# Patient Record
Sex: MALE
[De-identification: names, ages, dates, MRNs, and addresses within clinical notes are randomized per-mention and may not be internally consistent; named-entity substitution may affect disease eponyms.]

---

## 2019-03-25 PROBLEM — Z00.00 ENCOUNTER FOR PREVENTIVE HEALTH EXAMINATION: Status: ACTIVE | Noted: 2019-03-25

## 2019-03-29 ENCOUNTER — APPOINTMENT (OUTPATIENT)
Dept: ORTHOPEDIC SURGERY | Facility: CLINIC | Age: 39
End: 2019-03-29
Payer: COMMERCIAL

## 2019-03-29 VITALS — BODY MASS INDEX: 24.52 KG/M2 | WEIGHT: 185 LBS | HEIGHT: 73 IN

## 2019-03-29 PROCEDURE — 73030 X-RAY EXAM OF SHOULDER: CPT | Mod: RT

## 2019-03-29 PROCEDURE — 76882 US LMTD JT/FCL EVL NVASC XTR: CPT | Mod: 59

## 2019-03-29 PROCEDURE — 20611 DRAIN/INJ JOINT/BURSA W/US: CPT | Mod: RT

## 2019-03-29 PROCEDURE — 99204 OFFICE O/P NEW MOD 45 MIN: CPT | Mod: 25

## 2019-03-29 NOTE — HISTORY OF PRESENT ILLNESS
[Pain Location] : pain [] : right shoulder [___ mths] : [unfilled] month(s) ago [2] : a current pain level of 2/10 [Intermit.] : ~He/She~ states the symptoms seem to be intermittent [Lifting] : worsened by lifting [Rest] : relieved by rest [de-identified] : RIGHT SHOULDER\par JANUARY 2019\par CARRYING HEAVY CABINETS\par PAIN LEVEL 2/10\par INTERMITTENT PAIN\par RADIATING PAIN\par BURNING, SHOOTING PAIN\par NOTHING HELPS\par REACHING AND LIFTING MAKE IT WORSE\par NIGHTTIME PAIN\par INSTABILITY\par 3 WEEKS OF PT WITH NO RELIEF\par

## 2019-03-29 NOTE — REASON FOR VISIT
[Initial Visit] : an initial visit for [Shoulder Pain] : shoulder pain [FreeTextEntry2] : RIGHT SHOULDER

## 2019-03-29 NOTE — PHYSICAL EXAM
[de-identified] : PHYSICAL EXAM  RIGHT  SHOULDER\par \par NORMAL POSTURE / SCAPULAR PROTRACTION\par AROM 120 / 120 /  65 / 15 \par TENDER: SA REGION\par \par SPECIAL TESTING :\par POSITIVE ARELLANO\par POSITIVE ALANNA\par POSITIVE SPEED TEST\par \par NEGATIVE BEAR\par NEGATIVE APPREHENSION AND SUPPRESSION\par \par RC STRENGTH TESTING \par SS:  5/5\par SUB 5/5\par IS     5/5\par BICEPS  5/5\par \par SENSATION  - GROSSLY INTACT\par \par \par  [de-identified] : RIGHT SHOULDER XRAY -  \par NO OBVIOUS FRACTURE , NO SIGNIFICANT OSTEOARTHRITIS, SEPARATION OR DISLOCATION \par TYPE 2B ACROMION \par CSA= 29\par

## 2019-03-29 NOTE — PROCEDURE
[de-identified] : DIAGNOSTIC SONOGRAPHY of the Rotator Cuff Soft Tissue of the RIGHT SHOULDER was performed in Multiple Scan Planes with varying transducer frequencies.\par Imaging of the Supraspinatus Tendon reveals INFLAMMATION AND TENDONITIS WITH OUT SIGNIFICANT TEAR\par Imaging of the Biceps Tendon reveals no significant tear.\par Imaging of the Subscapularis Tendon reveals no significant tear.\par Imaging of the Infraspinatus Tendon reveals no significant tear.\par Key images were save digitally and reviewed with patient.\par \par \par Patient has demonstrated limited relief from NSAIDS, rest, exercises / PT, and after discussion of the risks and benefits, the patient has elected to proceed with an ULTRASOUND GUIDED injection into the RIGHT SUBACROMIAL  SPACE LATERAL APPROACH \par  \par Confirmed that the patient does not have history of prior adverse reactions, active, infections, or relevant allergies. There was no effusion, erythema, or warmth, and the skin was clear\par \par The skin was sterilized with alcohol. Ethyl Chloride was used as a topical anesthetic. Routine sterile technique. \par The site was injected UTILIZING ULTRASOUND GUIDANCE to confirm appropriate placement of the needle-\par with a mixture of medication and local anesthetic. The injection was completed without complication and a bandage was applied.\par  \par The patient tolerated the procedure well and was given post-injection instructions.Rec: Cold therapy, analgesics, avoid heavy activity.\par MEDICATION: 4cc of 1% xylocaine + 10mg of KENALOG\par \par  [FreeTextEntry1] : POST INJECTION INSTRUCTIONS\par \par COLD THERAPY , ANALGESICS PRN\par \par HOME STRETCHING AND EXERCISES QD\par \par START P.T.  2 WEEKS AFTER INJECTION \par \par MRI IF NO RELIEF\par

## 2019-05-03 ENCOUNTER — RX RENEWAL (OUTPATIENT)
Age: 39
End: 2019-05-03

## 2019-05-22 ENCOUNTER — APPOINTMENT (OUTPATIENT)
Dept: ORTHOPEDIC SURGERY | Facility: CLINIC | Age: 39
End: 2019-05-22
Payer: COMMERCIAL

## 2019-05-22 PROCEDURE — 99214 OFFICE O/P EST MOD 30 MIN: CPT

## 2019-05-22 NOTE — REASON FOR VISIT
[Follow-Up Visit] : a follow-up visit for [Shoulder Pain] : shoulder pain [FreeTextEntry2] : RIGHT SHOULDER

## 2019-05-22 NOTE — HISTORY OF PRESENT ILLNESS
[Pain Location] : pain [] : right shoulder [___ mths] : [unfilled] month(s) ago [2] : a current pain level of 2/10 [Intermit.] : ~He/She~ states the symptoms seem to be intermittent [Lifting] : worsened by lifting [Rest] : relieved by rest [de-identified] : RIGHT SHOULDER\par JANUARY 2019\par CARRYING HEAVY CABINETS\par WORSENING AFTER THROWING A WIFFLE BALL\par PAIN LEVEL 2/10\par INTERMITTENT PAIN\par RADIATING PAIN\par BURNING, SHOOTING PAIN\par NOTHING HELPS\par REACHING AND LIFTING MAKE IT WORSE\par NIGHTTIME PAIN\par INSTABILITY\par 3 WEEKS OF PT WITH NO RELIEF\par PREVIOUS CORTISONE INJECTION MARCH 2019 HELPED FOR A FEW WEEKS\par

## 2019-05-22 NOTE — PHYSICAL EXAM
[de-identified] : PHYSICAL EXAM  RIGHT  SHOULDER\par \par NORMAL POSTURE / SCAPULAR PROTRACTION\par AROM 120 / 120 /  65 / 15 \par TENDER: SA REGION\par \par SPECIAL TESTING :\par POSITIVE ARELLANO\par POSITIVE ALANNA\par POSITIVE SPEED TEST\par \par NEGATIVE BEAR\par NEGATIVE APPREHENSION AND SUPPRESSION\par \par RC STRENGTH TESTING \par SS:  5/5\par SUB 5/5\par IS     5/5\par BICEPS  5/5\par \par SENSATION  - GROSSLY INTACT\par \par \par

## 2019-05-22 NOTE — DISCUSSION/SUMMARY
[de-identified] : Patient had only temporary relief from subacromial Kenalog injection\par Physical therapy has aggravated symptoms\par Patient now has periscapular pain and trigger points\par \par I reviewed the MRI consistent with  impingement, tendinitis a small subacromial spur.\par \par Consider arthroscopic acromioplasty, decompression, bursectomy

## 2019-08-05 ENCOUNTER — APPOINTMENT (OUTPATIENT)
Dept: ORTHOPEDIC SURGERY | Facility: CLINIC | Age: 39
End: 2019-08-05
Payer: COMMERCIAL

## 2019-08-05 VITALS — HEIGHT: 73 IN | WEIGHT: 185 LBS | BODY MASS INDEX: 24.52 KG/M2

## 2019-08-05 DIAGNOSIS — M75.81 OTHER SHOULDER LESIONS, RIGHT SHOULDER: ICD-10-CM

## 2019-08-05 PROCEDURE — 99213 OFFICE O/P EST LOW 20 MIN: CPT | Mod: 57

## 2019-08-05 RX ORDER — OXYCODONE AND ACETAMINOPHEN 7.5; 325 MG/1; MG/1
7.5-325 TABLET ORAL
Qty: 42 | Refills: 0 | Status: ACTIVE | COMMUNITY
Start: 2019-08-05 | End: 1900-01-01

## 2019-08-05 NOTE — HISTORY OF PRESENT ILLNESS
[Pain Location] : pain [] : right shoulder [de-identified] : PRE SURGICAL \par RIGHT SHOULDER\par PAIN LEVEL 2/10\par \par ICE AND MEDICATION AS NEEDED

## 2019-08-05 NOTE — PHYSICAL EXAM
[de-identified] : PREOPERATIVE EXAM\par \par PATIENT IS WELL-DEVELOPED AND APPEARS WELL-NOURISHED\par \par NECK: FULL RANGE OF MOTION NONTENDER\par CHEST: CLEAR TO AUSCULTATION WITHOUT RALES OR RHONCHI\par CARDIAC REGULAR RATE AND RHYTHM NO OBVIOUS MURMURS\par ABDOMEN NONDISTENDED NONTENDER NO PALPABLE ORGANOMEGALY\par EXTREMITIES GENERALLY INTACT RANGE OF MOTION GENERALLY INTACT NEUROLOGIC EXAM\par \par  RIGHT  SHOULDER\par NORMAL POSTURE\par AROM 130 / 130 /  65 / 15 \par TENDER: SA REGION\par \par SPECIAL TESTING :\par POSITIVE ARELLANO\par POSITIVE ALANNA\par POSITIVE SPEED TEST\par \par NEGATIVE BEAR\par NEGATIVE APPREHENSION AND SUPPRESSION\par \par RC STRENGTH TESTING \par SS:  5/5\par SUB 5/5\par IS     5/5\par BICEPS  5/5\par \par SENSATION  - GROSSLY INTACT\par \par \par

## 2019-08-05 NOTE — DISCUSSION/SUMMARY
[de-identified] : PREOP SHOULDER SURGERY DISCUSSION:\par \par \par THERE ARE NO GUARANTEES THAT ALL SYMPTOMS WILL BE ALLEVIATED  \par SHOULDER ARTHROSCOPY, ACROMIOPLASTY, DEBRIDEMENT,  RC REPAIR AND LABRUM REPAIRS- ON AVERAGE 75- 85% SATISFACTORY RESULTS FOR TEARS < 3CM AFTER 9-12 MONTHS HEALING AND REHABILITATION. \par \par REPAIRS WILL REQUIRE STRICT SHOULDER IMMOBILIZER 4-6 WEEKS\par \par RC TEARS 3CM OR LARGER MAY REQUIRE COLLAGEN PATCH AUGMENTATION GENERALLY HAVE LESS SATISFACTORY RESULTS\par \par PHYSICAL THERAPY REQUIRED 2X WEEK FOR  MINIMUM 8-12 WEEKS FOR ALL PROCEDURES \par CONTINUED HOME EXERCISES 6-9 MONTHS AFTER THAT REQUIRED FOR OPTIMAL OUTCOMES \par \par ROUTINE SURGICAL AND ANESTHETIC RISKS INCLUDE RISK OF SURGICAL INFECTION, ANESTHETIC COMPLICATION OR ALLERGY, POSSIBLE RETEARS OR PROGRESSION OF TEAR, STIFFNESS OF SHOULDER AND UNSATISFACTORY OUTCOMES\par \par PATIENT UNDERSTANDS AND WISHES TO PROCEED\par

## 2019-08-06 ENCOUNTER — APPOINTMENT (OUTPATIENT)
Dept: ORTHOPEDIC SURGERY | Facility: AMBULATORY SURGERY CENTER | Age: 39
End: 2019-08-06
Payer: COMMERCIAL

## 2019-08-06 PROCEDURE — 29823 SHO ARTHRS SRG XTNSV DBRDMT: CPT | Mod: RT,59

## 2019-08-06 PROCEDURE — 29826 SHO ARTHRS SRG DECOMPRESSION: CPT | Mod: RT

## 2019-08-06 PROCEDURE — 29820 SHO ARTHRS SRG PRTL SYNVCT: CPT | Mod: RT,59

## 2019-08-09 ENCOUNTER — APPOINTMENT (OUTPATIENT)
Dept: ORTHOPEDIC SURGERY | Facility: CLINIC | Age: 39
End: 2019-08-09
Payer: COMMERCIAL

## 2019-08-09 VITALS — BODY MASS INDEX: 24.52 KG/M2 | WEIGHT: 185 LBS | HEIGHT: 73 IN

## 2019-08-09 PROCEDURE — 73030 X-RAY EXAM OF SHOULDER: CPT | Mod: RT

## 2019-08-09 PROCEDURE — 99024 POSTOP FOLLOW-UP VISIT: CPT

## 2019-08-09 RX ORDER — IBUPROFEN 800 MG/1
800 TABLET ORAL 3 TIMES DAILY
Qty: 90 | Refills: 5 | Status: ACTIVE | COMMUNITY
Start: 2019-08-09 | End: 1900-01-01

## 2019-08-09 NOTE — HISTORY OF PRESENT ILLNESS
[3] : an average pain level of 3/10 [Lifting] : worsened by lifting [de-identified] : RIGHT SHOULDER\par POST OP\par AUGUST 6, 2019 - RIGHT RICCO , GRADE 1 RC FRAYING \par PAIN LEVEL 3/10\par STIFFNESS\par GENERAL WEAKNESS\par NO NUMBNESS OR TINGLING\par ICE AND MEDICATION AS NEEDED\par D/C SLING\par PT STARTED PENDULUM EXERCISES\par PT AWARE NEEDS TO TAKE OFF BANDAGES IN 1 WEEK

## 2019-08-09 NOTE — PHYSICAL EXAM
[de-identified] : PHYSICAL EXAM SHOULDER POSTOP \par \par BANDAGE REMOVED\par PORTALS CLEAN AND DRY -  NO ERYTHEMA OR CALOR\par SUTURES REMOVED STERISTRIPS AND WATERPROOF BANDAGES APPLIES\par AROM NOT TESTED - DISTAL MOTOR AND SENSORY EXAN GROSSLY INTACT \par

## 2019-09-20 ENCOUNTER — APPOINTMENT (OUTPATIENT)
Dept: ORTHOPEDIC SURGERY | Facility: CLINIC | Age: 39
End: 2019-09-20

## 2019-09-27 ENCOUNTER — APPOINTMENT (OUTPATIENT)
Dept: ORTHOPEDIC SURGERY | Facility: CLINIC | Age: 39
End: 2019-09-27
Payer: COMMERCIAL

## 2019-09-27 VITALS — HEIGHT: 73 IN | WEIGHT: 185 LBS | BODY MASS INDEX: 24.52 KG/M2

## 2019-09-27 DIAGNOSIS — M75.41 IMPINGEMENT SYNDROME OF RIGHT SHOULDER: ICD-10-CM

## 2019-09-27 DIAGNOSIS — M25.511 PAIN IN RIGHT SHOULDER: ICD-10-CM

## 2019-09-27 PROCEDURE — 20552 NJX 1/MLT TRIGGER POINT 1/2: CPT | Mod: RT,58

## 2019-09-27 PROCEDURE — 99024 POSTOP FOLLOW-UP VISIT: CPT

## 2019-09-27 NOTE — PROCEDURE
[de-identified] : TRIGGER POINT INJECTIONS\par \par Patient has demonstrated limited relief from NSAIDS, rest, exercises / PT, and after discussion of the risks and benefits, the patient elected to proceed with a trigger point injection into the \par \par RIGHT RHOMBOID x2\par \par  I confirmed no prior adverse reactions, no active infections, and no relevant allergies. \par The skin was prepped in the usual sterile manner. The site was injected with local anesthetic followed by local needling. \par The injection was completed without complication and a bandage was applied.\par  \par The patient tolerated the procedure well and was given post-injection instructions. Cold Tx x 48 hours, analgesics. prn \par Medications: 1.5cc of 1% xylocaine + 1.5cc .25% Bupivicaine + KENALOG 1MG  per site\par

## 2019-09-27 NOTE — HISTORY OF PRESENT ILLNESS
[3] : an average pain level of 3/10 [Lifting] : worsened by lifting [de-identified] : RIGHT SHOULDER\par POST OP  6 WEEKS \par AUGUST 6, 2019 - RIGHT RICCO , GRADE 1 RC FRAYING\par NO PAIN UNLESS LIFTING\par SLIGHT NUMBNESS

## 2019-09-27 NOTE — PHYSICAL EXAM
[de-identified] : POST OP SHOULDER EXAM \par \par PORTALS HEALING WELL - NO ERYTHEMA OR CALOR\par \par AROM  140 / 140 / 70 / 20 \par \par IMPINGEMENT SIGNS RESOLVING \par \par ROTATOR CUFF STRENGTH TESTING = 4++ / 5\par \par

## 2021-07-31 NOTE — REASON FOR VISIT
[facility] Hospitalist Progress Note    Subjective     Patient seen sitting up in chair. History obtained through . Reports no bowel movement for several days. Continues to have back pain. No chest pain shortness of breath or other acute complaints.    Objective     I/O's    Intake/Output Summary (Last 24 hours) at 7/31/2021 0912  Last data filed at 7/31/2021 0830  Gross per 24 hour   Intake --   Output 700 ml   Net -700 ml       Last Recorded Vitals  Vitals with min/max:      Vital Last Value 24 Hour Range   Temperature 98.4 °F (36.9 °C) (07/31/21 0740) Temp  Min: 97.9 °F (36.6 °C)  Max: 98.6 °F (37 °C)   Pulse 69 (07/31/21 0740) Pulse  Min: 67  Max: 87   Respiratory 16 (07/31/21 0740) Resp  Min: 16  Max: 19   Non-Invasive  Blood Pressure 115/64 (07/31/21 0740) BP  Min: 99/60  Max: 132/75   Pulse Oximetry 95 % (07/31/21 0740) SpO2  Min: 95 %  Max: 98 %   Arterial   Blood Pressure (!) 186/75 (07/30/21 0800) No data recorded        Body mass index is 28.9 kg/m².     Physical Exam    General: No acute distress . Pleasant and interactive.  Head: No signs of head trauma.   Eyes: Extraocular motions intact.   Ears: Hearing grossly normal.   Heart: Regular rate and rhythm. No m/r/g  Lungs: Normal respiratory rate and effort with clear lung fields bilaterally.No wheezing or crackles.  Skin No obvious rashes noted on body. Skin warm and dry  Musculoskeletal: Back brace in place  Neurologic: Awake and alert. No focal defecits.   Psychiatric: Mood normal.     Labs      Recent Results (from the past 24 hour(s))   GLUCOSE, BEDSIDE - POINT OF CARE    Collection Time: 07/30/21 11:00 AM   Result Value Ref Range    GLUCOSE, BEDSIDE - POINT OF CARE 147 (H) 70 - 99 mg/dL   GLUCOSE, BEDSIDE - POINT OF CARE    Collection Time: 07/30/21  6:01 PM   Result Value Ref Range    GLUCOSE, BEDSIDE - POINT OF CARE 97 70 - 99 mg/dL   GLUCOSE, BEDSIDE - POINT OF CARE    Collection Time: 07/30/21  9:05 PM   Result Value Ref Range     GLUCOSE, BEDSIDE - POINT OF CARE 112 (H) 70 - 99 mg/dL   Magnesium    Collection Time: 07/31/21  5:47 AM   Result Value Ref Range    Magnesium 2.1 1.7 - 2.4 mg/dL   Phosphorus    Collection Time: 07/31/21  5:47 AM   Result Value Ref Range    Phosphorus 2.5 2.4 - 4.7 mg/dL   Basic Metabolic Panel    Collection Time: 07/31/21  5:47 AM   Result Value Ref Range    Fasting Status      Sodium 134 (L) 135 - 145 mmol/L    Potassium 4.2 3.4 - 5.1 mmol/L    Chloride 104 98 - 107 mmol/L    Carbon Dioxide 29 21 - 32 mmol/L    Anion Gap 5 (L) 10 - 20 mmol/L    Glucose 88 65 - 99 mg/dL    BUN 27 (H) 6 - 20 mg/dL    Creatinine 0.92 0.67 - 1.17 mg/dL    Glomerular Filtration Rate 79 (L) >90 mL/min/1.73m2    BUN/ Creatinine Ratio 29 (H) 7 - 25    Calcium 8.1 (L) 8.4 - 10.2 mg/dL   CBC with Automated Differential (performable only)    Collection Time: 07/31/21  5:47 AM   Result Value Ref Range    WBC 6.7 4.2 - 11.0 K/mcL    RBC 2.31 (L) 4.50 - 5.90 mil/mcL    HGB 8.5 (L) 13.0 - 17.0 g/dL    HCT 26.5 (L) 39.0 - 51.0 %    .7 (H) 78.0 - 100.0 fl    MCH 36.8 (H) 26.0 - 34.0 pg    MCHC 32.1 32.0 - 36.5 g/dL    RDW-CV 13.2 11.0 - 15.0 %    RDW-SD 56.0 (H) 39.0 - 50.0 fL     (L) 140 - 450 K/mcL    NRBC 0 <=0 /100 WBC   Manual Differential    Collection Time: 07/31/21  5:47 AM   Result Value Ref Range    Neutrophil, Percent 54 %    Lymphocytes, Percent 20 %    Mono, Percent 4 %    Eosinophils, Percent 12 %    Basophils, Percent 0 %    Bands, Percent 4 0 - 10 %    Reactive Lymphocytes, Percent 6 (H) 0 - 5 %    Absolute Neutrophil 3.9 1.8 - 7.7 K/mcL    Absolute Lymphocytes 1.7 1.0 - 4.0 K/mcL    Absolute Monocytes 0.3 0.3 - 0.9 K/mcL    Absolute Eosinophils 0.8 (H) 0.0 - 0.5 K/mcL    Absolute Basophils 0.0 0.0 - 0.3 K/mcL    WBC Morphology Normal Normal    Platelet Morphology Normal Normal    Polychromasia Few    GLUCOSE, BEDSIDE - POINT OF CARE    Collection Time: 07/31/21  7:54 AM   Result Value Ref Range    GLUCOSE, BEDSIDE -  POINT OF CARE 80 70 - 99 mg/dL         Imaging  XR LUMBAR SPINE 2 OR 3 VIEWS   Final Result       As above         Electronically Signed by: NANDO VIDAL M.D.    Signed on: 7/29/2021 1:38 PM          FL INTRAOPERATIVE   Final Result       As above         Electronically Signed by: NANDO VIDAL M.D.    Signed on: 7/29/2021 1:38 PM          CT LUMBAR SPINE WO CONTRAST   Final Result       Intraoperative O arm imaging used during L2 and L3 pedicle screw placement   and removal of L4 and L5 pedicle screws.      Electronically Signed by: PHILIP DYER M.D.    Signed on: 7/30/2021 9:13 AM              Assessment & Plan     Essential hypertension  Continue lisinopril 40 mg daily, no longer taking chlorthalidone or nadolol, IV hydralazine ordered as needed     Hypothyroidism  Continue levothyroxine 75 mcg daily     Autoimmune hemolytic anemia (CMS/HCC)  Continue folic acid 1 mg daily  Hgb stable currently     Chronic lymphocytic leukemia (CMS/HCC)  Ibrutinib currently on hold, can resume at time of discharge home     Chronic back pain  Status post L2-L5 posterior spinal fusion revision   Ct Valium as needed, on gabapentin, management per neurosurgery  PT evaluation ordered, encouraged incentive spirometry  Continue bowel regimen    Pain control    Constipation  -Aggressive bowel regiment         Current Facility-Administered Medications   Medication Dose Route Frequency Provider Last Rate Last Admin   • Potassium Standard Replacement Protocol   Does not apply See Admin Instructions Thor Ramirez MD       • Magnesium Standard Replacement Protocol   Does not apply See Admin Instructions Thor Ramirez MD       • HYDROcodone-acetaminophen (NORCO)  MG per tablet 1 tablet  1 tablet Oral Q4H PRN Berta Pepper CNP   1 tablet at 07/31/21 0750   • HYDROcodone-acetaminophen (NORCO) 5-325 MG per tablet 1 tablet  1 tablet Oral Q4H PRN Berta Pepper CNP       • HYDROmorphone (DILAUDID) injection 0.5 mg  0.5 mg  Intravenous Q2H PRN Berta Pepper CNP   0.5 mg at 07/30/21 1654   • pravastatin (PRAVACHOL) tablet 20 mg  20 mg Oral Nightly Berta Pepper CNP   20 mg at 07/30/21 2031   • nadolol (CORGARD) tablet 20 mg  20 mg Oral Daily Berta Pepper CNP   20 mg at 07/31/21 0750   • dextrose 50 % injection 25 g  25 g Intravenous PRN Berta Pepper CNP       • dextrose 50 % injection 12.5 g  12.5 g Intravenous PRN Berta Pepper CNP       • insulin lispro (ADMELOG,HumaLOG) - Correction Dose   Subcutaneous 4x Daily AC & HS Berta Pepper CNP       • docusate sodium-sennosides (SENOKOT S) 50-8.6 MG 1 tablet  1 tablet Oral Nightly DEVAN Bonilla-C   1 tablet at 07/30/21 2030   • polyethylene glycol (MIRALAX) packet 17 g  17 g Oral Daily Salud Clarke PA-C   17 g at 07/31/21 0750   • ketotifen (ZADITOR) 0.025 % ophthalmic solution 1 drop  1 drop Both Eyes BID PRN Daily Srivastava CNP       • sodium chloride 0.9 % flush bag 25 mL  25 mL Intravenous PRN Moi Torres       • sodium chloride (PF) 0.9 % injection 2 mL  2 mL Intracatheter 2 times per day Moi Torres   2 mL at 07/31/21 0800   • acetaminophen (TYLENOL) tablet 1,000 mg  1,000 mg Oral Q8H PRN Moi Torres       • ondansetron (ZOFRAN ODT) disintegrating tablet 4 mg  4 mg Oral Q12H PRN Moi Torres        Or   • ondansetron (ZOFRAN) injection 4 mg  4 mg Intravenous Q12H PRN Moi Torres       • diazePAM (VALIUM) tablet 2 mg  2 mg Oral Q8H PRN Moi Torres       • polyethylene glycol (MIRALAX) packet 17 g  17 g Oral Daily PRN Moi Torres       • enoxaparin (LOVENOX) injection 40 mg  40 mg Subcutaneous Daily Moi Torres   40 mg at 07/31/21 0800   • fluticasone (FLONASE) 50 MCG/ACT nasal spray 1 spray  1 spray Each Nare Daily PRN Moi Torres       • gabapentin (NEURONTIN) capsule 300 mg  300 mg Oral TID Moi Torres   300 mg at 07/31/21 0750   • levothyroxine (SYNTHROID, LEVOTHROID) tablet 75 mcg  75 mcg Oral QAM AC Moi Torres   75 mcg at 07/31/21  0602   • lisinopril (ZESTRIL) tablet 40 mg  40 mg Oral Daily Moi Torres   40 mg at 07/31/21 0750   • pantoprazole (PROTONIX) EC tablet 40 mg  40 mg Oral Daily Moi Torres   40 mg at 07/31/21 0602   • plecanatide (TRULANCE) 3 mg  3 mg Oral Daily Moi Torres   3 mg at 07/31/21 0750   • hydrALAZINE (APRESOLINE) injection 10 mg  10 mg Intravenous Q6H PRN Odette Arreola, DO       • folic acid (FOLATE) tablet 1 mg  1 mg Oral Daily Odette Simsls, DO   1 mg at 07/31/21 0750       DVT PPX:   Current Active Medications for DVT Prophylaxis (From admission, onward)         Stop     enoxaparin (LOVENOX) injection 40 mg  40 mg,   Subcutaneous,   DAILY      --               Primary Care Physician Bam Aguilar MD  Code Status   Code Status: Full Resuscitation    Rashawn Baker MD  [facility] Hospitalist     [Post Operative Visit] : a post operative visit for [Shoulder Pain] : shoulder pain [FreeTextEntry2] : RIGHT SHOULDER